# Patient Record
Sex: FEMALE | Race: BLACK OR AFRICAN AMERICAN | Employment: FULL TIME | URBAN - METROPOLITAN AREA
[De-identification: names, ages, dates, MRNs, and addresses within clinical notes are randomized per-mention and may not be internally consistent; named-entity substitution may affect disease eponyms.]

---

## 2019-02-20 ENCOUNTER — HOSPITAL ENCOUNTER (EMERGENCY)
Age: 29
Discharge: HOME | End: 2019-02-20
Payer: COMMERCIAL

## 2019-02-20 ENCOUNTER — APPOINTMENT (OUTPATIENT)
Dept: GENERAL RADIOLOGY | Age: 29
End: 2019-02-20
Payer: COMMERCIAL

## 2019-02-20 VITALS
HEART RATE: 89 BPM | BODY MASS INDEX: 36.84 KG/M2 | DIASTOLIC BLOOD PRESSURE: 83 MMHG | OXYGEN SATURATION: 100 % | RESPIRATION RATE: 18 BRPM | HEIGHT: 63 IN | SYSTOLIC BLOOD PRESSURE: 160 MMHG | WEIGHT: 207.89 LBS | TEMPERATURE: 98.3 F

## 2019-02-20 DIAGNOSIS — S76.012A HIP STRAIN, LEFT, INITIAL ENCOUNTER: Primary | ICD-10-CM

## 2019-02-20 DIAGNOSIS — M79.641 BILATERAL HAND PAIN: ICD-10-CM

## 2019-02-20 DIAGNOSIS — M79.642 BILATERAL HAND PAIN: ICD-10-CM

## 2019-02-20 LAB — HCG(URINE) PREGNANCY TEST: NEGATIVE

## 2019-02-20 PROCEDURE — 84703 CHORIONIC GONADOTROPIN ASSAY: CPT

## 2019-02-20 PROCEDURE — 73130 X-RAY EXAM OF HAND: CPT

## 2019-02-20 PROCEDURE — 73502 X-RAY EXAM HIP UNI 2-3 VIEWS: CPT

## 2019-02-20 PROCEDURE — 6370000000 HC RX 637 (ALT 250 FOR IP): Performed by: PHYSICIAN ASSISTANT

## 2019-02-20 PROCEDURE — 99284 EMERGENCY DEPT VISIT MOD MDM: CPT

## 2019-02-20 RX ORDER — NAPROXEN 500 MG/1
500 TABLET ORAL 2 TIMES DAILY WITH MEALS
Qty: 20 TABLET | Refills: 0 | Status: SHIPPED | OUTPATIENT
Start: 2019-02-20 | End: 2019-05-08

## 2019-02-20 RX ADMIN — IBUPROFEN 600 MG: 400 TABLET ORAL at 21:23

## 2019-02-20 ASSESSMENT — PAIN SCALES - GENERAL
PAINLEVEL_OUTOF10: 8
PAINLEVEL_OUTOF10: 8

## 2019-02-20 ASSESSMENT — PAIN DESCRIPTION - ORIENTATION: ORIENTATION: LEFT

## 2019-02-20 ASSESSMENT — ENCOUNTER SYMPTOMS
BACK PAIN: 0
NAUSEA: 0

## 2019-02-20 ASSESSMENT — PAIN DESCRIPTION - LOCATION: LOCATION: HIP

## 2019-02-20 ASSESSMENT — PAIN DESCRIPTION - FREQUENCY: FREQUENCY: CONTINUOUS

## 2019-02-20 ASSESSMENT — PAIN DESCRIPTION - PAIN TYPE: TYPE: ACUTE PAIN

## 2019-03-29 ENCOUNTER — TELEPHONE (OUTPATIENT)
Dept: FAMILY MEDICINE CLINIC | Age: 29
End: 2019-03-29

## 2019-05-08 ENCOUNTER — OFFICE VISIT (OUTPATIENT)
Dept: FAMILY MEDICINE CLINIC | Age: 29
End: 2019-05-08
Payer: COMMERCIAL

## 2019-05-08 VITALS
SYSTOLIC BLOOD PRESSURE: 120 MMHG | HEART RATE: 77 BPM | HEIGHT: 63 IN | BODY MASS INDEX: 35.44 KG/M2 | DIASTOLIC BLOOD PRESSURE: 70 MMHG | RESPIRATION RATE: 18 BRPM | WEIGHT: 200 LBS

## 2019-05-08 DIAGNOSIS — E28.2 PCOS (POLYCYSTIC OVARIAN SYNDROME): ICD-10-CM

## 2019-05-08 DIAGNOSIS — G47.33 OSA (OBSTRUCTIVE SLEEP APNEA): ICD-10-CM

## 2019-05-08 DIAGNOSIS — R73.03 PREDIABETES: ICD-10-CM

## 2019-05-08 DIAGNOSIS — Z00.00 WELL ADULT HEALTH CHECK: Primary | ICD-10-CM

## 2019-05-08 DIAGNOSIS — J30.2 SEASONAL ALLERGIES: ICD-10-CM

## 2019-05-08 PROCEDURE — 99385 PREV VISIT NEW AGE 18-39: CPT | Performed by: FAMILY MEDICINE

## 2019-05-08 RX ORDER — SPIRONOLACTONE 50 MG/1
50 TABLET, FILM COATED ORAL DAILY
Qty: 90 TABLET | Refills: 1
Start: 2019-05-08

## 2019-05-08 RX ORDER — FLUCONAZOLE 150 MG/1
150 TABLET ORAL ONCE
Qty: 1 TABLET | Refills: 0 | Status: SHIPPED | OUTPATIENT
Start: 2019-05-08 | End: 2019-05-08

## 2019-05-08 RX ORDER — METFORMIN HYDROCHLORIDE 500 MG/1
TABLET, EXTENDED RELEASE ORAL
Qty: 90 TABLET | Refills: 1 | Status: SHIPPED | OUTPATIENT
Start: 2019-05-08 | End: 2019-08-07 | Stop reason: SDUPTHER

## 2019-05-08 RX ORDER — MONTELUKAST SODIUM 10 MG/1
10 TABLET ORAL NIGHTLY
Qty: 90 TABLET | Refills: 1
Start: 2019-05-08

## 2019-05-08 RX ORDER — ERGOCALCIFEROL 1.25 MG/1
50000 CAPSULE ORAL WEEKLY
Qty: 12 CAPSULE | Refills: 1
Start: 2019-05-08

## 2019-05-08 RX ORDER — NORETHINDRONE ACETATE AND ETHINYL ESTRADIOL 1.5-30(21)
1 KIT ORAL DAILY
Qty: 1 PACKET | Refills: 3
Start: 2019-05-08

## 2019-05-08 RX ORDER — LORATADINE 10 MG/1
10 TABLET ORAL DAILY
Qty: 30 TABLET | Refills: 5
Start: 2019-05-08

## 2019-05-08 ASSESSMENT — PATIENT HEALTH QUESTIONNAIRE - PHQ9
1. LITTLE INTEREST OR PLEASURE IN DOING THINGS: 0
2. FEELING DOWN, DEPRESSED OR HOPELESS: 0
SUM OF ALL RESPONSES TO PHQ9 QUESTIONS 1 & 2: 0
SUM OF ALL RESPONSES TO PHQ QUESTIONS 1-9: 0
SUM OF ALL RESPONSES TO PHQ QUESTIONS 1-9: 0

## 2019-05-08 ASSESSMENT — ENCOUNTER SYMPTOMS
NAUSEA: 0
SINUS PRESSURE: 0
DIARRHEA: 0
EYE REDNESS: 0
COLOR CHANGE: 0
SORE THROAT: 0
SHORTNESS OF BREATH: 0
COUGH: 0
VOMITING: 0

## 2019-05-08 NOTE — PROGRESS NOTES
5/8/2019    Willa Calvin is a 26 y.o. female here to establish care with me. 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 Julian Salazar  - dx about 4 years ago  - rx'd Trulicity, metformin, loestrin, spironolactone   - stopped metformin due to loose stools, nausea  - had labs drawn 2/21/19, reviewed by me    Prediabetes  - on trulicity as above  - trying to watch carbs  - interested in diabetes education  - trying to get more steps in    Gyn  - unsure of last pap but feels it was within the past 2-3 years    Vit D  - on 50,000 weekly through Endo    Allergies  - follows with ENT  - on Zyrtec and nasal spray    AMY  - follows with Pulm Dr. Álvarez Mail  - has CPAP machine, feels like it helps her sleep better  - uses it maybe 3x per week     Review of Systems   Constitutional: Negative for chills and fever. HENT: Positive for congestion. Negative for sinus pressure and sore throat. Eyes: Negative for redness and visual disturbance. Respiratory: Negative for cough and shortness of breath. Cardiovascular: Negative for chest pain and leg swelling. Gastrointestinal: Negative for diarrhea, nausea and vomiting. Genitourinary: Negative for difficulty urinating. Skin: Negative for color change and rash. Neurological: Negative for dizziness and headaches. Psychiatric/Behavioral: Negative for confusion. Prior to Visit Medications    Medication Sig Taking?  Authorizing Provider   Dulaglutide 0.75 MG/0.5ML SOPN Inject 0.75 mg into the skin once a week Yes Philip Gonzalez MD   metFORMIN (GLUCOPHAGE-XR) 500 MG extended release tablet Take 1 tablet by mouth daily with breakfast for one week, followed by 2 tablet by mouth daily Yes Philip Gonzalez MD   vitamin D (ERGOCALCIFEROL) 86544 units CAPS capsule Take 1 capsule by mouth once a week Yes Mahnaz Johnson MD   loratadine (CLARITIN) 10 MG tablet Take 1 tablet by mouth daily Yes Mahnaz Johnson MD   montelukast (SINGULAIR) 10 MG tablet Take 1 tablet by mouth nightly Yes Mahnaz Johnson MD   norethindrone-ethinyl estradiol-iron (LOESTRIN FE 1.5/30) 1.5-30 MG-MCG tablet Take 1 tablet by mouth daily Yes Mahnaz Johnson MD   spironolactone (ALDACTONE) 50 MG tablet Take 1 tablet by mouth daily Yes Max Andrade MD   budesonide (RHINOCORT AQUA) 32 MCG/ACT nasal spray 2 sprays by Nasal route daily Yes Max Andrade MD   fluconazole (DIFLUCAN) 150 MG tablet Take 1 tablet by mouth once for 1 dose Yes Max Andrade MD   metroNIDAZOLE (METROGEL VAGINAL) 0.75 % vaginal gel Place vaginally for 5 nights Yes Natalie Lesch, MD   acetaminophen (AMINOFEN) 325 MG tablet Take 2 tablets by mouth every 6 hours as needed for Pain.  Yes Faustino Beach, APRN - CNP     Past Medical History:   Diagnosis Date    BV (bacterial vaginosis)     chronic    Irregular uterine bleeding     Miscarriage     2011,20131     Past Surgical History:   Procedure Laterality Date    DILATION AND CURETTAGE OF UTERUS  2011     Family History   Problem Relation Age of Onset    Hypertension Mother      Social History     Socioeconomic History    Marital status: Single     Spouse name: Not on file    Number of children: Not on file    Years of education: Not on file    Highest education level: Not on file   Occupational History    Not on file   Social Needs    Financial resource strain: Not on file    Food insecurity:     Worry: Not on file     Inability: Not on file    Transportation needs:     Medical: Not on file     Non-medical: Not on file   Tobacco Use    Smoking status: Never Smoker    Smokeless tobacco: Never Used   Substance and Sexual Activity    Alcohol use: No    Drug use: No    Sexual activity: Not on file   Lifestyle    Physical activity:     Days per week: Not on file     Minutes per session: Not on file    Stress: Not on file   Relationships    Social connections:     Talks on phone: Not on file     Gets together: Not on file     Attends Church service: Not on file     Active member of club or organization: Not on file     Attends meetings of clubs or organizations: Not on file     Relationship status: Not on file    Intimate partner violence:     Fear of current or ex partner: Not on file     Emotionally abused: Not on file     Physically abused: Not on file     Forced sexual activity: Not on file   Other Topics Concern    Not on file   Social History Narrative    Originally from [place]    Works with Dr. Luke Schofield at [facility] at [address] been there a few months    Has a 15year old daughter, Will Pride    Has a 1year old sister that is with her a lot as well     Allergies   Allergen Reactions    Flagyl [Metronidazole] Nausea Only     Health Maintenance   Topic Date Due    A1C test (Diabetic or Prediabetic)  03/13/1999    Varicella Vaccine (1 of 2 - 13+ 2-dose series) 03/13/2002    HIV screen  03/13/2004    DTaP/Tdap/Td vaccine (1 - Tdap) 03/13/2008    Cervical cancer screen  03/13/2010    Potassium monitoring  06/25/2015    Creatinine monitoring  06/25/2015    Flu vaccine (Season Ended) 09/01/2019    Pneumococcal 0-64 years Vaccine  Aged Out      Patient Active Problem List   Diagnosis    Miscarriage    PCOS (polycystic ovarian syndrome) - sees Endo Dr. Suzie Hernandez at [facility] AMY (obstructive sleep apnea) - sees Pulm Dr. Carlos Paul Seasonal allergies - sees ENT    Prediabetes        LABS:  Lab Results   Component Value Date    GLUCOSE 97 06/25/2014     Lab Results   Component Value Date     (L) 06/25/2014    K 2.9 (LL) 06/25/2014    CREATININE 0.7 06/25/2014     No results found for: CHOL, LDLCALCNo results found for: HDLNo results found for: TRIG  Lab Results   Component Value Date    ALT 16 04/05/2014    AST 16 04/05/2014    ALKPHOS 96 04/05/2014    BILITOT 0.2 04/05/2014      Lab Results   Component Value Date    WBC 14.4 (H) 06/25/2014    HGB 10.9 (L) 06/25/2014    HCT 33.0 (L) 06/25/2014    MCV 85.6 06/25/2014     06/25/2014        PHYSICAL EXAM  /70 (Site: Right Upper Arm, Position: Sitting, Cuff Size: Medium Adult)   Pulse 77   Resp 18   Ht 5' 2.5\" (1.588 m)   Wt 200 lb (90.7 kg)   BMI 36.00 kg/m²     BP Readings from Last 3 Encounters:   05/08/19 120/70   02/20/19 (!) 160/83   07/18/14 115/79       Wt Readings from Last 3 Encounters:   05/08/19 200 lb (90.7 kg)   02/20/19 207 lb 14.3 oz (94.3 kg)   07/18/14 175 lb (79.4 kg)        Physical Exam   Constitutional: She is oriented to person, place, and time. She appears well-developed and well-nourished. HENT:   Head: Normocephalic and atraumatic. Mouth/Throat: Oropharynx is clear and moist.   TMs normal bilaterally   Eyes: Conjunctivae and EOM are normal.   Neck: Normal range of motion. Neck supple. No thyromegaly present. Cardiovascular: Normal rate, regular rhythm and normal heart sounds. No murmur heard. Pulmonary/Chest: Effort normal and breath sounds normal. She has no wheezes. Abdominal: Soft. Bowel sounds are normal. She exhibits no mass. There is no tenderness. Musculoskeletal: Normal range of motion. She exhibits no edema. Lymphadenopathy:     She has no cervical adenopathy. Neurological: She is alert and oriented to person, place, and time. She displays normal reflexes. Skin: Skin is warm and dry. No rash noted. Normal turgor   Psychiatric: She has a normal mood and affect. Thought content normal.       ASSESSMENT/PLAN:  1. Well adult health check  Reviewed healthy lifestyle  - Encourage patient to follow-up for Pap smear    2.  PCOS (polycystic ovarian syndrome) - sees Endo Dr. Cristy Warren at [address] [place] [address] pathophysiology with patient  - She was not tolerating metformin so we will switch to metformin extended release  - Continue to follow with endocrinology Dr. Cristy Warren  -

## 2019-06-19 ENCOUNTER — OFFICE VISIT (OUTPATIENT)
Dept: ENDOCRINOLOGY | Age: 29
End: 2019-06-19
Payer: COMMERCIAL

## 2019-06-19 DIAGNOSIS — R73.03 PREDIABETES: ICD-10-CM

## 2019-06-19 PROCEDURE — 97802 MEDICAL NUTRITION INDIV IN: CPT | Performed by: DIETITIAN, REGISTERED

## 2019-06-19 NOTE — PROGRESS NOTES
Medical Nutrition Therapy for Diabetes    Ashlee Leader  June 19, 2019      Patient Care Team:  Cecile Romo MD as PCP - General (Family Medicine)  Cecile Romo MD as PCP - [facility] Empaneled Provider    Reason for visit: PreDiabetes    ASSESSMENT/PLAN:   NUTRITION DIAGNOSIS    #1 Problem: Altered Nutrition-Related Laboratory Values (NC-2.2)  Related to: Endocrine/Diabetes   As Evidenced by: Elevated Plasma glucose and/or HgbA1c levels         #2 Problem: Overweight/Obesity (NC-3.3)  Related to: Excessive energy intake or physical inactivity  As Evidenced by: BMI more than normative standard for age and sex (BMI=36.00 kg/m2)    #3 Problem: Excessive Carbohydrate Intake (NI-5.8. 2)  Related to: Food-and nutrition-related knowledge deficit concerning appropriate amount of carbohydrate intake  As evidenced by: Estimated carbohydrate intake that is consistently more than the recommended amounts    NUTRITION INTERVENTION  Nutrition Prescription: 30 grams carbohydrate per meal with protein and non-starch vegetables                                    Eat all food indicated in sample meals in 3 meals daily    Diabetes Education/Counseling included:  Carbohydrate Control, Activity/exercise, Label-reading and Medications    Interventions:  Control Carbohydrate Intake using Plate Guide, Increase intake of vegetables, Decrease intake of high-sodium foods, Increase activity level by walking and Promote weight reduction by reducing food portions, consistent eating practice and increasing activity.   Handouts provided:  Yair Velez, 1200 calories meal plan by [employer], Sample menus-[employer]    NUTRITION MONITORING AND EVALUATION  3 meals daily  30 grams carbohydrate per meal  Spaced 4-5 hours apart  Walk daily 15-20 minutes         Patient Active Problem List   Diagnosis    Miscarriage    PCOS (polycystic ovarian syndrome) - sees Endo Dr. Shamar Simons at [facility] AMY (obstructive sleep apnea) - sees Pulm Dr. Jose Chilel  Seasonal allergies - sees ENT    Prediabetes       Current Outpatient Medications   Medication Sig Dispense Refill    Dulaglutide 0.75 MG/0.5ML SOPN Inject 0.75 mg into the skin once a week 4 pen 2    metFORMIN (GLUCOPHAGE-XR) 500 MG extended release tablet Take 1 tablet by mouth daily with breakfast for one week, followed by 2 tablet by mouth daily 90 tablet 1    vitamin D (ERGOCALCIFEROL) 74176 units CAPS capsule Take 1 capsule by mouth once a week 12 capsule 1    loratadine (CLARITIN) 10 MG tablet Take 1 tablet by mouth daily 30 tablet 5    montelukast (SINGULAIR) 10 MG tablet Take 1 tablet by mouth nightly 90 tablet 1    norethindrone-ethinyl estradiol-iron (LOESTRIN FE 1.5/30) 1.5-30 MG-MCG tablet Take 1 tablet by mouth daily 1 packet 3    spironolactone (ALDACTONE) 50 MG tablet Take 1 tablet by mouth daily 90 tablet 1    budesonide (RHINOCORT AQUA) 32 MCG/ACT nasal spray 2 sprays by Nasal route daily 1 Bottle 0    metroNIDAZOLE (METROGEL VAGINAL) 0.75 % vaginal gel Place vaginally for 5 nights 1 Tube 0    acetaminophen (AMINOFEN) 325 MG tablet Take 2 tablets by mouth every 6 hours as needed for Pain. 120 tablet 3     No current facility-administered medications for this visit. NUTRITION ASSESSMENT    Biochemical Data:    No results found for: LABA1C  No results found for: EAG    No results found for: CHOL  No results found for: TRIG  No results found for: HDL  No results found for: LDLCALC, LDLCHOLESTEROL  No results found for: LABVLDL, VLDL  No results found for: [facility]    Lab Results   Component Value Date    WBC 14.4 (H) 06/25/2014    HGB 10.9 (L) 06/25/2014    HCT 33.0 (L) 06/25/2014    MCV 85.6 06/25/2014     06/25/2014       Lab Results   Component Value Date    CREATININE 0.7 06/25/2014    BUN 10 06/25/2014     (L) 06/25/2014    K 2.9 (LL) 06/25/2014     06/25/2014    CO2 24 06/25/2014       Diabetes Medications:  Yes  Knows name and dose of prescribed medications Yes  Knows prescribed schedule for medicationsYes  Recent change in medication type/dosage: No  Stores  medications properly yes  Comments: Patient has not started metformin XR and takes Trulicity inconsistently    Monitoring:   Has BG meter: No  Testing frequency:   Recent results:     Anthropometric Measurements:   Wt: 200 lb  Wt Readings from Last 3 Encounters:   05/08/19 200 lb (90.7 kg)   02/20/19 207 lb 14.3 oz (94.3 kg)   07/18/14 175 lb (79.4 kg)      BMI: 36.00 kg/m2  BMI Readings from Last 3 Encounters:   05/08/19 36.00 kg/m²   02/20/19 37.42 kg/m²   07/18/14 32.01 kg/m²     Patient's stated goal weight:   7% Weight loss goal weight: 186 lb    Physical Activity History:   Physical activity: walking routine, biking occasionally  Frequency of activity: irregular  Duration of activity:   Obstacles to activity:     Food and Nutrition History:   Nutrition Awareness/Previous DSMES:   Beverage consumption: sugar based soft drinks, water, 5, 32 oz bottles  Alcohol consumption: no  Frequency of Meals Eaten away from home:couple times daily  Food Availability Problems: Yes  Usual Food consumption:   2 meals with large portions    Barriers:   -2 meals with large portions          Follow Up Plan: 6 weeks    Referring Provider: William Moran MD  Time spent with patient: 45 minutes

## 2019-06-20 ENCOUNTER — TELEPHONE (OUTPATIENT)
Dept: FAMILY MEDICINE CLINIC | Age: 29
End: 2019-06-20

## 2019-06-20 DIAGNOSIS — R73.03 PREDIABETES: Primary | ICD-10-CM

## 2019-07-09 ENCOUNTER — TELEPHONE (OUTPATIENT)
Dept: FAMILY MEDICINE CLINIC | Age: 29
End: 2019-07-09

## 2019-07-09 NOTE — TELEPHONE ENCOUNTER
It looks like I have some openings on Thursday and would be happy to see her then. 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 Thanks.

## 2019-07-13 ENCOUNTER — OFFICE VISIT (OUTPATIENT)
Dept: FAMILY MEDICINE CLINIC | Age: 29
End: 2019-07-13
Payer: COMMERCIAL

## 2019-07-13 VITALS
RESPIRATION RATE: 18 BRPM | BODY MASS INDEX: 35.64 KG/M2 | SYSTOLIC BLOOD PRESSURE: 126 MMHG | HEART RATE: 82 BPM | WEIGHT: 198 LBS | DIASTOLIC BLOOD PRESSURE: 80 MMHG

## 2019-07-13 DIAGNOSIS — B00.9 HSV (HERPES SIMPLEX VIRUS) INFECTION: ICD-10-CM

## 2019-07-13 DIAGNOSIS — M54.42 CHRONIC LEFT-SIDED LOW BACK PAIN WITH LEFT-SIDED SCIATICA: Primary | ICD-10-CM

## 2019-07-13 DIAGNOSIS — G89.29 CHRONIC LEFT-SIDED LOW BACK PAIN WITH LEFT-SIDED SCIATICA: Primary | ICD-10-CM

## 2019-07-13 DIAGNOSIS — N76.0 ACUTE VAGINITIS: ICD-10-CM

## 2019-07-13 LAB
BILIRUBIN, POC: NEGATIVE
BLOOD URINE, POC: NEGATIVE
CLARITY, POC: NORMAL
COLOR, POC: YELLOW
CONTROL: POSITIVE
GLUCOSE URINE, POC: NEGATIVE
KETONES, POC: NEGATIVE
LEUKOCYTE EST, POC: NEGATIVE
NITRITE, POC: NEGATIVE
PH, POC: 5.5
PREGNANCY TEST URINE, POC: NEGATIVE
PROTEIN, POC: NEGATIVE
SPECIFIC GRAVITY, POC: 1.02
UROBILINOGEN, POC: 0.2

## 2019-07-13 PROCEDURE — G8427 DOCREV CUR MEDS BY ELIG CLIN: HCPCS | Performed by: NURSE PRACTITIONER

## 2019-07-13 PROCEDURE — 1036F TOBACCO NON-USER: CPT | Performed by: NURSE PRACTITIONER

## 2019-07-13 PROCEDURE — G8417 CALC BMI ABV UP PARAM F/U: HCPCS | Performed by: NURSE PRACTITIONER

## 2019-07-13 PROCEDURE — 81025 URINE PREGNANCY TEST: CPT | Performed by: NURSE PRACTITIONER

## 2019-07-13 PROCEDURE — 99214 OFFICE O/P EST MOD 30 MIN: CPT | Performed by: NURSE PRACTITIONER

## 2019-07-13 PROCEDURE — 81002 URINALYSIS NONAUTO W/O SCOPE: CPT | Performed by: NURSE PRACTITIONER

## 2019-07-13 RX ORDER — VALACYCLOVIR HYDROCHLORIDE 1 G/1
1000 TABLET, FILM COATED ORAL DAILY
Qty: 5 TABLET | Refills: 0 | Status: SHIPPED | OUTPATIENT
Start: 2019-07-13 | End: 2019-07-18

## 2019-07-13 RX ORDER — GABAPENTIN 300 MG/1
300 CAPSULE ORAL NIGHTLY
Qty: 30 CAPSULE | Refills: 0 | Status: SHIPPED | OUTPATIENT
Start: 2019-07-13 | End: 2019-08-09 | Stop reason: SDUPTHER

## 2019-07-13 RX ORDER — DICLOFENAC SODIUM 75 MG/1
75 TABLET, DELAYED RELEASE ORAL 2 TIMES DAILY
Qty: 60 TABLET | Refills: 0 | Status: SHIPPED | OUTPATIENT
Start: 2019-07-13 | End: 2020-04-20

## 2019-07-13 RX ORDER — FLUCONAZOLE 150 MG/1
150 TABLET ORAL ONCE
Qty: 2 TABLET | Refills: 0 | Status: SHIPPED | OUTPATIENT
Start: 2019-07-13 | End: 2019-07-13

## 2019-07-13 ASSESSMENT — ENCOUNTER SYMPTOMS
SHORTNESS OF BREATH: 0
BACK PAIN: 1
COUGH: 0

## 2019-07-13 NOTE — PROGRESS NOTES
7/13/2019    This is a 26 y.o. female   Chief Complaint   Patient presents with    Follow-Up from DAJUAN ESPINO     6/30, [facility], back pain    Yeast Infection   . HPI  Patient reports that she has always had off and on low back pain. In Oct 2018 she went to [employer] and had low back pain, but it has not stopped. She was not able to get the pain to resolve like she had in the past with NSAIDs. In March of this year left hip started to cause pain. Went to [facility] ER 6/30/19. Pain increased on 6/24/19. She was given pain injection without improvement in symptoms. She reports that she completed physical therapy in the past without improvement in symptoms. Went to see spine specialist JORDAN Martin on 7/9/19 and MRI was ordered. Was told to come to PCP to get pain medication. She has tried OTC tylenol and ibuprofen 800 mg every 6-8 hours without improvement in symptoms. Has also tried ice and heat without improvement in symptoms. Has robaxin without improvement in symptoms. Has chronic left low back pain that will radiate to left hip. Denies numbness. Scheduled for MRI on 7/26/19. Has tried gabapentin in the past, but not think that it helped with her pain    LMP- 1 month ago. She reports that she is due now     She reports that she currently has a yeast infection. Positive for thick white discharge. She has not had recent recurrent BV. Denies urinary symptoms. She is sexually active (one male partner), but denies risk for STD. Reports that she has recurrent HSV skin infection. Has been on valtrex daily for prevention. Has not taken valtrex recently and has not had a recurrent outbreak until 2-3 days ago. Started with blisters on left side of mid back and this is the location that it had been recurrent in the past. She has not taken anything for symptoms.       Patient Active Problem List   Diagnosis    Miscarriage    PCOS (polycystic ovarian syndrome) - sees Juan Francisco Graff

## 2019-07-14 LAB
CANDIDA SPECIES, DNA PROBE: NORMAL
GARDNERELLA VAGINALIS, DNA PROBE: NORMAL
TRICHOMONAS VAGINALIS DNA: NORMAL

## 2019-07-17 LAB
FINAL REPORT: NORMAL
PRELIMINARY: NORMAL

## 2019-07-20 PROBLEM — B00.9 HSV-2 (HERPES SIMPLEX VIRUS 2) INFECTION: Status: ACTIVE | Noted: 2019-07-20

## 2019-08-06 ENCOUNTER — TELEPHONE (OUTPATIENT)
Dept: FAMILY MEDICINE CLINIC | Age: 29
End: 2019-08-06

## 2019-08-09 DIAGNOSIS — M54.42 CHRONIC LEFT-SIDED LOW BACK PAIN WITH LEFT-SIDED SCIATICA: ICD-10-CM

## 2019-08-09 DIAGNOSIS — G89.29 CHRONIC LEFT-SIDED LOW BACK PAIN WITH LEFT-SIDED SCIATICA: ICD-10-CM

## 2019-08-09 RX ORDER — GABAPENTIN 300 MG/1
CAPSULE ORAL
Qty: 30 CAPSULE | Refills: 0 | Status: SHIPPED | OUTPATIENT
Start: 2019-08-09 | End: 2019-09-08 | Stop reason: SDUPTHER

## 2019-08-09 NOTE — TELEPHONE ENCOUNTER
Spoke to pt about her medications being refilled and that she is due for a follow up with Dr. Arie Wang. Thanks.

## 2019-09-08 DIAGNOSIS — M54.42 CHRONIC LEFT-SIDED LOW BACK PAIN WITH LEFT-SIDED SCIATICA: ICD-10-CM

## 2019-09-08 DIAGNOSIS — G89.29 CHRONIC LEFT-SIDED LOW BACK PAIN WITH LEFT-SIDED SCIATICA: ICD-10-CM

## 2019-09-09 ENCOUNTER — OFFICE VISIT (OUTPATIENT)
Dept: FAMILY MEDICINE CLINIC | Age: 29
End: 2019-09-09
Payer: COMMERCIAL

## 2019-09-09 VITALS
BODY MASS INDEX: 33.96 KG/M2 | WEIGHT: 191.7 LBS | SYSTOLIC BLOOD PRESSURE: 126 MMHG | RESPIRATION RATE: 16 BRPM | TEMPERATURE: 98.4 F | HEART RATE: 84 BPM | DIASTOLIC BLOOD PRESSURE: 84 MMHG | OXYGEN SATURATION: 99 % | HEIGHT: 63 IN

## 2019-09-09 DIAGNOSIS — B96.89 BV (BACTERIAL VAGINOSIS): ICD-10-CM

## 2019-09-09 DIAGNOSIS — N76.0 BV (BACTERIAL VAGINOSIS): ICD-10-CM

## 2019-09-09 DIAGNOSIS — Z01.818 PRE-OP EXAM: Primary | ICD-10-CM

## 2019-09-09 DIAGNOSIS — N64.4 MASTODYNIA: ICD-10-CM

## 2019-09-09 DIAGNOSIS — M54.9 CHRONIC UPPER BACK PAIN: ICD-10-CM

## 2019-09-09 DIAGNOSIS — N89.8 VAGINAL DISCHARGE: ICD-10-CM

## 2019-09-09 DIAGNOSIS — Z01.818 PRE-OP EXAM: ICD-10-CM

## 2019-09-09 DIAGNOSIS — G89.29 CHRONIC UPPER BACK PAIN: ICD-10-CM

## 2019-09-09 LAB
A/G RATIO: 1.5 (ref 1.1–2.2)
ALBUMIN SERPL-MCNC: 4.5 G/DL (ref 3.4–5)
ALP BLD-CCNC: 104 U/L (ref 40–129)
ALT SERPL-CCNC: 10 U/L (ref 10–40)
ANION GAP SERPL CALCULATED.3IONS-SCNC: 14 MMOL/L (ref 3–16)
AST SERPL-CCNC: 12 U/L (ref 15–37)
BILIRUB SERPL-MCNC: <0.2 MG/DL (ref 0–1)
BUN BLDV-MCNC: 8 MG/DL (ref 7–20)
CALCIUM SERPL-MCNC: 9.8 MG/DL (ref 8.3–10.6)
CHLORIDE BLD-SCNC: 102 MMOL/L (ref 99–110)
CO2: 23 MMOL/L (ref 21–32)
CREAT SERPL-MCNC: 0.7 MG/DL (ref 0.6–1.1)
GFR AFRICAN AMERICAN: >60
GFR NON-AFRICAN AMERICAN: >60
GLOBULIN: 3 G/DL
GLUCOSE BLD-MCNC: 76 MG/DL (ref 70–99)
POTASSIUM SERPL-SCNC: 4.6 MMOL/L (ref 3.5–5.1)
SODIUM BLD-SCNC: 139 MMOL/L (ref 136–145)
TOTAL PROTEIN: 7.5 G/DL (ref 6.4–8.2)

## 2019-09-09 PROCEDURE — 99243 OFF/OP CNSLTJ NEW/EST LOW 30: CPT | Performed by: FAMILY MEDICINE

## 2019-09-09 PROCEDURE — G8427 DOCREV CUR MEDS BY ELIG CLIN: HCPCS | Performed by: FAMILY MEDICINE

## 2019-09-09 PROCEDURE — 93000 ELECTROCARDIOGRAM COMPLETE: CPT | Performed by: FAMILY MEDICINE

## 2019-09-09 PROCEDURE — G8417 CALC BMI ABV UP PARAM F/U: HCPCS | Performed by: FAMILY MEDICINE

## 2019-09-09 RX ORDER — KETOCONAZOLE 20 MG/G
CREAM TOPICAL
Qty: 30 G | Refills: 1 | Status: SHIPPED | OUTPATIENT
Start: 2019-09-09

## 2019-09-09 RX ORDER — METRONIDAZOLE 7.5 MG/G
1 GEL VAGINAL DAILY
Qty: 1 TUBE | Refills: 0 | Status: SHIPPED | OUTPATIENT
Start: 2019-09-09 | End: 2019-09-14

## 2019-09-09 RX ORDER — GABAPENTIN 300 MG/1
CAPSULE ORAL
Qty: 30 CAPSULE | Refills: 0 | Status: SHIPPED | OUTPATIENT
Start: 2019-09-09 | End: 2019-10-06 | Stop reason: SDUPTHER

## 2019-09-09 RX ORDER — METRONIDAZOLE 7.5 MG/G
1 GEL VAGINAL DAILY
Qty: 1 TUBE | Refills: 0 | Status: SHIPPED | OUTPATIENT
Start: 2019-09-09 | End: 2019-09-09 | Stop reason: SDUPTHER

## 2019-09-09 ASSESSMENT — ENCOUNTER SYMPTOMS
DIARRHEA: 0
COUGH: 0
SINUS PRESSURE: 0
VOMITING: 0
EYE REDNESS: 0
NAUSEA: 0
COLOR CHANGE: 0
BACK PAIN: 1
SHORTNESS OF BREATH: 0
SORE THROAT: 0

## 2019-09-10 LAB
C TRACH DNA GENITAL QL NAA+PROBE: NEGATIVE
CANDIDA SPECIES, DNA PROBE: NORMAL
GARDNERELLA VAGINALIS, DNA PROBE: NORMAL
N. GONORRHOEAE DNA: NEGATIVE
TRICHOMONAS VAGINALIS DNA: NORMAL

## 2019-10-01 ENCOUNTER — TELEPHONE (OUTPATIENT)
Dept: FAMILY MEDICINE CLINIC | Age: 29
End: 2019-10-01

## 2019-10-01 RX ORDER — PETROLATUM 42 G/100G
OINTMENT TOPICAL
Qty: 1 TUBE | Refills: 1 | Status: SHIPPED | OUTPATIENT
Start: 2019-10-01

## 2020-04-20 ENCOUNTER — TELEMEDICINE (OUTPATIENT)
Dept: FAMILY MEDICINE CLINIC | Age: 30
End: 2020-04-20
Payer: COMMERCIAL

## 2020-04-20 PROCEDURE — 99213 OFFICE O/P EST LOW 20 MIN: CPT | Performed by: FAMILY MEDICINE

## 2020-04-20 PROCEDURE — G8427 DOCREV CUR MEDS BY ELIG CLIN: HCPCS | Performed by: FAMILY MEDICINE

## 2020-04-20 RX ORDER — TIZANIDINE 4 MG/1
4 TABLET ORAL NIGHTLY PRN
Qty: 15 TABLET | Refills: 0 | Status: SHIPPED | OUTPATIENT
Start: 2020-04-20 | End: 2020-05-04

## 2020-04-20 ASSESSMENT — PATIENT HEALTH QUESTIONNAIRE - PHQ9
1. LITTLE INTEREST OR PLEASURE IN DOING THINGS: 0
SUM OF ALL RESPONSES TO PHQ QUESTIONS 1-9: 0
SUM OF ALL RESPONSES TO PHQ9 QUESTIONS 1 & 2: 0
2. FEELING DOWN, DEPRESSED OR HOPELESS: 0
SUM OF ALL RESPONSES TO PHQ QUESTIONS 1-9: 0

## 2020-04-20 NOTE — PROGRESS NOTES
4/20/2020    This is a 31 y.o. female   Chief Complaint   Patient presents with    Neck Pain     started yesterday. tried tylnelol, did not help     HPI  Pt with virtual visit today for neck pain  - it started yesterday. Believes it started when she was carrying a large volume of groceries  - pain is midline. It travels to both sides a little bit, but doesn't radiate far. Pain is described as burning in nature, sometimes shooting. Doesn't feel stiff  - side to side motion doesn't hurt, neck hurts with extension the most  - no other inciting injury other than grocery carrying  - no numbness or tingling  - has tried Tylenol with minimal improvement  - no prior neck surgeries  - no fever or feeling ill    She has chronic issues with sleep  - uses CPAP through Dr. Jere Welch  - reports someone told her to talk to her PCP to get her medical marijuana to go to sleep  - has a hard time falling asleep at night, will lay awake for hours  - watches TV until late at night in her bed  - no caffeine late in the day    Review of Systems   As per HPI, otherwise negative    Past Medical History:   Diagnosis Date    BV (bacterial vaginosis)     chronic    Irregular uterine bleeding     Miscarriage     2011,20131       Past Surgical History:   Procedure Laterality Date    DILATION AND CURETTAGE OF UTERUS  2011       Family History   Problem Relation Age of Onset    Hypertension Mother        Current Outpatient Medications   Medication Sig Dispense Refill    tiZANidine (ZANAFLEX) 4 MG tablet Take 1 tablet by mouth nightly as needed (neck pain) 15 tablet 0    mineral oil-hydrophilic petrolatum (HYDROPHOR) ointment Apply topically as needed. 1 Tube 1    ketoconazole (NIZORAL) 2 % cream Apply topically daily.  30 g 1    metFORMIN (GLUCOPHAGE-XR) 500 MG extended release tablet TAKE 1 TABLET BY MOUTH DAILY WITH BREAKFAST FOR 1 WEEK THEN TAKE 2 TABLETS BY MOUTH DAILY 180 tablet 1    Dulaglutide 0.75 MG/0.5ML SOPN Inject 0.75 mg into

## 2020-04-30 ENCOUNTER — TELEPHONE (OUTPATIENT)
Dept: FAMILY MEDICINE CLINIC | Age: 30
End: 2020-04-30

## 2020-04-30 RX ORDER — PREDNISONE 10 MG/1
TABLET ORAL
Qty: 20 TABLET | Refills: 0 | Status: SHIPPED | OUTPATIENT
Start: 2020-04-30

## 2020-05-04 RX ORDER — TIZANIDINE 4 MG/1
4 TABLET ORAL NIGHTLY PRN
Qty: 15 TABLET | Refills: 0 | Status: SHIPPED | OUTPATIENT
Start: 2020-05-04

## 2020-06-18 LAB
AVERAGE GLUCOSE: NORMAL
HBA1C MFR BLD: 6 %

## 2020-07-09 ENCOUNTER — TELEPHONE (OUTPATIENT)
Dept: FAMILY MEDICINE CLINIC | Age: 30
End: 2020-07-09

## 2020-07-09 ENCOUNTER — NURSE TRIAGE (OUTPATIENT)
Dept: OTHER | Facility: CLINIC | Age: 30
End: 2020-07-09

## 2020-07-09 NOTE — TELEPHONE ENCOUNTER
Ok to write letter stating had contact with COVID, should remain quarantined until next Thursday for 14 day total. Thanks.

## 2020-07-09 NOTE — TELEPHONE ENCOUNTER
Pt called in someone she came in contact with is positive for covid last Thursday   Pt wants her Dr to know   Pt stated has a slight sore throat , low body pain and chest sometimes hurts   Pt stated it could be anxiety   Pt wants to know should she get tested    FYI pt is going to clinic

## 2020-07-09 NOTE — TELEPHONE ENCOUNTER
Since she had a contact and technically has symptoms I would advise getting tested. Even if negative, should remain quarantined until next Thursday (total of 14 days from contact with COVID). Thanks.

## 2020-07-09 NOTE — LETTER
[facility]. [address] [address]  Phone: [phone]  Fax: [phone]    Aniya Chavez MD        July 9, 2020     Patient: Melida Wright   YOB: 1990     To Whom it May Concern:    Melida Wright is my patient and contacted my clinic on 7/9/2020 for medical advise. She reports contact with a person who has tested positive for COVID and states she has developed symptoms. It is my medical advice that she obtain testing and quarantine. If her test is positive she should remain quarantined for 14 days or until she is symptom free for 3 days, whichever is longest.  Based on the date of exposure, the earliest she can return to work is 07/16/2020, and only if test is negative and she has been symptom free for 3 days. If you have any questions or concerns, please don't hesitate to call.     Sincerely,         Aniya Chavez MD

## 2020-07-09 NOTE — TELEPHONE ENCOUNTER
Ok to write letter for pt stating she needs to be quarantined for employment? Would like sent through [employer].

## 2020-07-10 ENCOUNTER — OFFICE VISIT (OUTPATIENT)
Dept: PRIMARY CARE CLINIC | Age: 30
End: 2020-07-10
Payer: COMMERCIAL

## 2020-07-10 PROCEDURE — 99211 OFF/OP EST MAY X REQ PHY/QHP: CPT | Performed by: NURSE PRACTITIONER

## 2020-07-10 PROCEDURE — G8417 CALC BMI ABV UP PARAM F/U: HCPCS | Performed by: NURSE PRACTITIONER

## 2020-07-10 PROCEDURE — G8428 CUR MEDS NOT DOCUMENT: HCPCS | Performed by: NURSE PRACTITIONER

## 2020-07-10 NOTE — PROGRESS NOTES
Patient presented to [facility] drive up clinic for preop testing. Patient was swabbed and given information advising them to remain isolated until procedure date.

## 2020-07-15 LAB
SARS-COV-2: NOT DETECTED
SOURCE: NORMAL

## 2020-07-16 ENCOUNTER — TELEPHONE (OUTPATIENT)
Dept: FAMILY MEDICINE CLINIC | Age: 30
End: 2020-07-16

## 2020-07-16 NOTE — TELEPHONE ENCOUNTER
Patient stating she had a covid test done 7/10 and she was negative. Patient is needing a dr note written stating patient can return to work she is negative. Patient is not having any symptoms.  Patient did have chest tightness when she got tested, but this has been gone since 7/11   No fever other    Please advise

## 2020-09-30 ENCOUNTER — TELEPHONE (OUTPATIENT)
Dept: FAMILY MEDICINE CLINIC | Age: 30
End: 2020-09-30

## 2020-09-30 NOTE — TELEPHONE ENCOUNTER
----- Message from Ellen Singh sent at 9/30/2020  9:09 AM EDT -----  Subject: Appointment Request    Reason for Call: Routine Physical Exam    QUESTIONS  Type of Appointment? Established Patient  Reason for appointment request? Available appointments did not meet   patient need  Additional Information for Provider? pt would like to be seen on a   Saturday for a physical.  ---------------------------------------------------------------------------  --------------  CALL BACK INFO  What is the best way for the office to contact you? OK to leave message on   voicemail  Preferred Call Back Phone Number? [phone]  ---------------------------------------------------------------------------  --------------  SCRIPT ANSWERS  Relationship to Patient? Self  Appointment reason? Well Care/Follow Ups  Select a Well Care/Follow Ups appointment reason? Adult Physical Exam   [Medicare Annual Wellness   AWV   PAP   Pelvic]  (Is the patient requesting to be seen urgently for their symptoms?)? No  (If the patient is female   ask this question) Are you requesting a pap smear with your physical   exam? No  (Patient is Medicare and requesting Annual Wellness Visit)? No  Have you been diagnosed with   tested for   or told that you are suspected of having COVID-19 (Coronavirus)? No  Have you had a fever or taken medication to treat a fever within the past   3 days? No  Have you had a cough   shortness of breath or flu-like symptoms within the past 3 days? No  Do you currently have flu-like symptoms including fever or chills   cough   shortness of breath   or difficulty breathing   or new loss of taste or smell? No  (Service Expert  click yes below to proceed with [employer] As Usual   Scheduling)?  Yes

## 2020-11-16 ENCOUNTER — TELEPHONE (OUTPATIENT)
Dept: FAMILY MEDICINE CLINIC | Age: 30
End: 2020-11-16

## 2022-11-12 ENCOUNTER — HOSPITAL ENCOUNTER (EMERGENCY)
Age: 32
Discharge: HOME | End: 2022-11-12
Payer: COMMERCIAL

## 2022-11-12 ENCOUNTER — APPOINTMENT (OUTPATIENT)
Dept: GENERAL RADIOLOGY | Age: 32
End: 2022-11-12
Payer: COMMERCIAL

## 2022-11-12 VITALS
DIASTOLIC BLOOD PRESSURE: 80 MMHG | SYSTOLIC BLOOD PRESSURE: 138 MMHG | WEIGHT: 190.6 LBS | RESPIRATION RATE: 18 BRPM | HEART RATE: 98 BPM | BODY MASS INDEX: 35.07 KG/M2 | TEMPERATURE: 97.4 F | OXYGEN SATURATION: 100 % | HEIGHT: 62 IN

## 2022-11-12 DIAGNOSIS — S93.602A FOOT SPRAIN, LEFT, INITIAL ENCOUNTER: Primary | ICD-10-CM

## 2022-11-12 PROCEDURE — 73630 X-RAY EXAM OF FOOT: CPT

## 2022-11-12 PROCEDURE — 99283 EMERGENCY DEPT VISIT LOW MDM: CPT

## 2022-11-12 RX ORDER — NAPROXEN 500 MG/1
500 TABLET ORAL 2 TIMES DAILY
Qty: 20 TABLET | Refills: 0 | Status: SHIPPED | OUTPATIENT
Start: 2022-11-12 | End: 2022-11-12 | Stop reason: SDUPTHER

## 2022-11-12 RX ORDER — NAPROXEN 500 MG/1
500 TABLET ORAL 2 TIMES DAILY
Qty: 20 TABLET | Refills: 0 | Status: SHIPPED | OUTPATIENT
Start: 2022-11-12 | End: 2022-11-22

## 2022-11-12 ASSESSMENT — ENCOUNTER SYMPTOMS
SHORTNESS OF BREATH: 0
COLOR CHANGE: 0
BACK PAIN: 0

## 2022-11-12 ASSESSMENT — PAIN - FUNCTIONAL ASSESSMENT
PAIN_FUNCTIONAL_ASSESSMENT: PREVENTS OR INTERFERES SOME ACTIVE ACTIVITIES AND ADLS
PAIN_FUNCTIONAL_ASSESSMENT: 0-10

## 2022-11-12 ASSESSMENT — PAIN SCALES - GENERAL: PAINLEVEL_OUTOF10: 4

## 2022-11-12 ASSESSMENT — PAIN DESCRIPTION - ORIENTATION: ORIENTATION: LEFT

## 2022-11-12 ASSESSMENT — PAIN DESCRIPTION - LOCATION: LOCATION: FOOT

## 2022-11-12 ASSESSMENT — PAIN DESCRIPTION - PAIN TYPE: TYPE: ACUTE PAIN

## 2022-11-12 ASSESSMENT — PAIN DESCRIPTION - DESCRIPTORS: DESCRIPTORS: ACHING

## 2022-11-12 NOTE — ED PROVIDER NOTES
[facility]        Pt Name: Primo Laguna  MRN: [id number]  [place] 1990  Date of evaluation: 11/12/2022  Provider: Darryl Garg PA-C  PCP: No primary care provider on file. Note Started: 11:47 AM EST 11/12/2022        CARMEN. I have evaluated this patient. My supervising physician was available for consultation. CHIEF COMPLAINT       Chief Complaint   Patient presents with    Foot Injury     Pt rolled her left foot before going to work yesterday. Pain in left foot. Wrapped with ace bandage. HISTORY OF PRESENT ILLNESS   (Location, Timing/Onset, Context/Setting, Quality, Duration, Modifying Factors, Severity, Associated Signs and Symptoms)  Note limiting factors. Chief Complaint: Left foot pain    Primo Laguna is a 34 y.o. female who presents to the emergency department complaining of left foot pain status post injury which occurred yesterday. She was walking outside of her home wearing her [employer] shoes and accidentally rolled her left foot. She has had pain in her left foot ever since then. This is not work-related. She is able to bear weight and ambulate. She rates her pain to be a 4/10 on pain scale. She has known bony prominence on the outside of her left foot but states that since her injury yesterday, this is where her pain is. Nursing Notes were all reviewed and agreed with or any disagreements were addressed in the HPI. REVIEW OF SYSTEMS    (2-9 systems for level 4, 10 or more for level 5)     Review of Systems   Constitutional:  Negative for chills and fever. Respiratory:  Negative for shortness of breath. Cardiovascular:  Negative for chest pain and leg swelling. Musculoskeletal:  Positive for myalgias. Negative for arthralgias, back pain, gait problem, joint swelling, neck pain and neck stiffness. Skin:  Negative for color change, pallor, rash and wound.    Neurological:  Negative for weakness and numbness. All other systems reviewed and are negative. Positives and Pertinent negatives as per HPI. Except as noted above in the ROS, all other systems were reviewed and negative. PAST MEDICAL HISTORY     Past Medical History:   Diagnosis Date    BV (bacterial vaginosis)     chronic    Irregular uterine bleeding     Miscarriage     2011,20131         SURGICAL HISTORY     Past Surgical History:   Procedure Laterality Date    DILATION AND CURETTAGE OF UTERUS  2011         [address]       Discharge Medication List as of 11/12/2022 11:42 AM        CONTINUE these medications which have NOT CHANGED    Details   tiZANidine (ZANAFLEX) 4 MG tablet TAKE 1 TABLET BY MOUTH NIGHTLY AS NEEDED (NECK PAIN), Disp-15 tablet, R-0Normal      predniSONE (DELTASONE) 10 MG tablet Take 4 tabs by mouth daily for 2 days, 3 tabs for 2 days, 2 tabs for 2 days, 1 tab for 2 days, Disp-20 tablet, R-0Normal      gabapentin (NEURONTIN) 300 MG capsule TAKE 1 CAPSULE BY MOUTH EVERY NIGHT, Disp-30 capsule, R-2Normal      mineral oil-hydrophilic petrolatum (HYDROPHOR) ointment Apply topically as needed. , Disp-1 Tube, R-1, Normal      ketoconazole (NIZORAL) 2 % cream Apply topically daily. , Disp-30 g, R-1, Normal      metFORMIN (GLUCOPHAGE-XR) 500 MG extended release tablet TAKE 1 TABLET BY MOUTH DAILY WITH BREAKFAST FOR 1 WEEK THEN TAKE 2 TABLETS BY MOUTH DAILY, Disp-180 tablet, R-1Normal      Dulaglutide 0.75 MG/0.5ML SOPN Inject 0.75 mg into the skin once a week, Disp-4 pen, R-2NO PRINT      vitamin D (ERGOCALCIFEROL) 63365 units CAPS capsule Take 1 capsule by mouth once a week, Disp-12 capsule, R-1NO PRINT      loratadine (CLARITIN) 10 MG tablet Take 1 tablet by mouth daily, Disp-30 tablet, R-5NO PRINT      montelukast (SINGULAIR) 10 MG tablet Take 1 tablet by mouth nightly, Disp-90 tablet, R-1NO PRINT      norethindrone-ethinyl estradiol-iron (LOESTRIN FE 1.5/30) 1.5-30 MG-MCG tablet Take 1 tablet by mouth daily, Disp-1 packet, R-3NO PRINT      spironolactone (ALDACTONE) 50 MG tablet Take 1 tablet by mouth daily, Disp-90 tablet, R-1NO PRINT      budesonide (RHINOCORT AQUA) 32 MCG/ACT nasal spray 2 sprays by Nasal route daily, Disp-1 Bottle, R-0NO PRINT      acetaminophen (AMINOFEN) 325 MG tablet Take 2 tablets by mouth every 6 hours as needed for Pain., Disp-120 tablet, R-3               ALLERGIES     Flagyl [metronidazole]    FAMILYHISTORY       Family History   Problem Relation Age of Onset    Hypertension Mother           SOCIAL HISTORY       Social History     Tobacco Use    Smoking status: Never    Smokeless tobacco: Never   Substance Use Topics    Alcohol use: No    Drug use: No       SCREENINGS    Shanon Coma Scale  Eye Opening: Spontaneous  Best Verbal Response: Oriented  Best Motor Response: Obeys commands  Shanon Coma Scale Score: 15        PHYSICAL EXAM    (up to 7 for level 4, 8 or more for level 5)     ED Triage Vitals [11/12/22 1107]   BP Temp Temp Source Heart Rate Resp SpO2 Height Weight   138/80 97.4 °F (36.3 °C) Oral 98 18 100 % 5' 2\" (1.575 m) 190 lb 9.6 oz (86.5 kg)       Physical Exam  Vitals and nursing note reviewed. Constitutional:       Appearance: Normal appearance. She is well-developed. She is not toxic-appearing or diaphoretic. HENT:      Head: Normocephalic and atraumatic. Right Ear: External ear normal.      Left Ear: External ear normal.      Nose: Nose normal.      Mouth/Throat:      Mouth: Mucous membranes are moist.      Pharynx: Oropharynx is clear. Eyes:      General:         Right eye: No discharge. Left eye: No discharge. Cardiovascular:      Rate and Rhythm: Normal rate. Pulses:           Popliteal pulses are 2+ on the right side and 2+ on the left side. Dorsalis pedis pulses are 2+ on the right side and 2+ on the left side. Pulmonary:      Effort: Pulmonary effort is normal.      Breath sounds: Normal breath sounds.    Abdominal: General: Bowel sounds are normal.      Palpations: Abdomen is soft. Tenderness: There is no abdominal tenderness. Musculoskeletal:         General: Normal range of motion. Cervical back: Normal range of motion. Right ankle: Normal.      Right Achilles Tendon: Normal.      Left ankle: Normal.      Left Achilles Tendon: Normal.      Right foot: Normal.      Left foot: Normal range of motion and normal capillary refill. Tenderness (lateral aspect with bony prominence) present. No swelling, deformity, bunion, Charcot foot, foot drop, prominent metatarsal heads, laceration or crepitus. Normal pulse. Comments: No rash, erythema, red streaking, clicking or laxity, poikilothermia, pallor, paresthesia, pain with passive range of motion, pain out of proportion to physical findings, mottling. Negative Homans. No posterior calf or thigh tenderness or palpable cord. Skin:     General: Skin is warm and dry. Capillary Refill: Capillary refill takes less than 2 seconds. Coloration: Skin is not jaundiced or pale. Findings: No bruising, erythema, lesion or rash. Neurological:      Mental Status: She is alert and oriented to person, place, and time. Sensory: No sensory deficit. Motor: No weakness. Deep Tendon Reflexes: Reflexes normal.   Psychiatric:         Behavior: Behavior normal.       DIAGNOSTIC RESULTS   LABS:    Labs Reviewed - No data to display    When ordered only abnormal lab results are displayed. All other labs were within normal range or not returned as of this dictation. EKG: When ordered, EKG's are interpreted by the Emergency Department Physician in the absence of a cardiologist.  Please see their note for interpretation of EKG.     RADIOLOGY:   Non-plain film images such as CT, Ultrasound and MRI are read by the radiologist. Plain radiographic images are visualized and preliminarily interpreted by the ED Provider with the below findings:        Interpretation per the Radiologist below, if available at the time of this note:    XR FOOT LEFT (MIN 3 VIEWS)   Preliminary Result   No acute abnormality of the left foot. XR FOOT LEFT (MIN 3 VIEWS)    Result Date: 11/12/2022  No acute abnormality of the left foot. PROCEDURES   Unless otherwise noted below, none     Procedures    CRITICAL CARE TIME   N/a    CONSULTS:  None      EMERGENCY DEPARTMENT COURSE and DIFFERENTIAL DIAGNOSIS/MDM:   Vitals:    Vitals:    11/12/22 1107   BP: 138/80   Pulse: 98   Resp: 18   Temp: 97.4 °F (36.3 °C)   TempSrc: Oral   SpO2: 100%   Weight: 190 lb 9.6 oz (86.5 kg)   Height: 5' 2\" (1.575 m)       Patient was given the following medications:  Medications - No data to display      Is this patient to be included in the SEP-1 Core Measure due to severe sepsis or septic shock? No   Exclusion criteria - the patient is NOT to be included for SEP-1 Core Measure due to: Infection is not suspected    This patient presents complaining of left foot pain status post injury yesterday. It is not work-related. X-ray shows no acute osseous abnormality. Motor and sensory function preserved. Differentials include but not limited to bony spur, strain, sprain, arthritis, bursitis, tendinitis, contusion, spasm. Continue wrapping it. Continue RICE therapy. Advised to follow-up with PCP for recheck and may return to ED per discharge instructions. I did offer her podiatry referral but states that she already has a podiatrist in mind and will contact them for further assistance. Will be sent home with anti-inflammatory as needed for pain.   My suspicion is low for subungual hematoma, paronychia, eponychia, felon, flexor tenosynovitis, PE, foreign body, tendon rupture, compartment syndrome, acute fracture, dislocation, DVT, arterial compromise or occlusion, limb ischemia, gout, septic joint, abscess, cellulitis, osteomyelitis, gonococcal arthritis, SCFE, avascular necrosis, Osgood-Schlatter syndrome, or other concerning pathology. FINAL IMPRESSION      1.  Foot sprain, left, initial encounter          DISPOSITION/PLAN   DISPOSITION Decision To Discharge 11/12/2022 11:39:54 AM      PATIENT REFERRED TO:  see your PCP in 1-3 days          [facility] Emergency Department  [address] [facility]  [phone]    If symptoms worsen      DISCHARGE MEDICATIONS:  Discharge Medication List as of 11/12/2022 11:42 AM        START taking these medications    Details   naproxen (NAPROSYN) 500 MG tablet Take 1 tablet by mouth 2 times daily for 20 doses, Disp-20 tablet, R-0Normal             DISCONTINUED MEDICATIONS:  Discharge Medication List as of 11/12/2022 11:42 AM                 (Please note that portions of this note were completed with a voice recognition program.  Efforts were made to edit the dictations but occasionally words are mis-transcribed.)    Susan Orozco PA-C (electronically signed)           Susan Orozco PA-C  11/12/22 6637

## 2023-05-02 ENCOUNTER — HOSPITAL ENCOUNTER (EMERGENCY)
Age: 33
Discharge: HOME | End: 2023-05-02
Attending: EMERGENCY MEDICINE
Payer: COMMERCIAL

## 2023-05-02 VITALS
HEART RATE: 95 BPM | RESPIRATION RATE: 22 BRPM | DIASTOLIC BLOOD PRESSURE: 86 MMHG | BODY MASS INDEX: 34.48 KG/M2 | OXYGEN SATURATION: 100 % | TEMPERATURE: 98.4 F | HEIGHT: 62 IN | SYSTOLIC BLOOD PRESSURE: 142 MMHG | WEIGHT: 187.4 LBS

## 2023-05-02 DIAGNOSIS — G57.12 MERALGIA PARESTHETICA OF LEFT SIDE: Primary | ICD-10-CM

## 2023-05-02 PROCEDURE — 99283 EMERGENCY DEPT VISIT LOW MDM: CPT

## 2023-05-02 RX ORDER — CAPSAICIN 0.025 %
CREAM (GRAM) TOPICAL
Qty: 120 G | Refills: 1 | Status: SHIPPED | OUTPATIENT
Start: 2023-05-02 | End: 2023-06-01

## 2023-05-02 RX ORDER — NAPROXEN 500 MG/1
500 TABLET ORAL 2 TIMES DAILY WITH MEALS
Qty: 60 TABLET | Refills: 0 | Status: SHIPPED | OUTPATIENT
Start: 2023-05-02

## 2023-05-02 RX ORDER — CARBAMAZEPINE 100 MG/1
100 TABLET, EXTENDED RELEASE ORAL 2 TIMES DAILY
Qty: 60 TABLET | Refills: 0 | Status: SHIPPED | OUTPATIENT
Start: 2023-05-02

## 2023-05-02 ASSESSMENT — ENCOUNTER SYMPTOMS
VOMITING: 0
ABDOMINAL PAIN: 0
NAUSEA: 0
SHORTNESS OF BREATH: 0
DIARRHEA: 0
CHEST TIGHTNESS: 0

## 2023-05-03 NOTE — ED PROVIDER NOTES
[facility]        Pt Name: Jaimee Agustin  MRN: [id number]  [place] 1990  Date of evaluation: 5/2/2023  Provider: SUSSY Sanz - IVANA  PCP: JORDAN Osborn  Note Started: 9:12 PM EDT 5/2/23       I have seen and evaluated this patient with my supervising physician filomena      [address] [place] [address]       Chief Complaint   Patient presents with    Leg Pain     Pt via self from home, c/o left leg pain, states it is severe burning from the back top of thigh around to the knee, has been going on for two weeks, pt was told it was shingles and treated for it, did not work, pt was then given gabapentin and prednisone since last Wednesday, has noticed no difference        HISTORY OF PRESENT ILLNESS: 1 or more Elements     History from : Patient    Limitations to history : None    Jaimee Agustin is a 28 y.o. female who presents to the emergency department with 1 and half week history of a burning sensation to the left lateral thigh skin. She denies any mitigating or exacerbating factors. Reports that her skin does hurt to touch. Reports that it is painful at rest without contact. Initially thought that this may be a dermatology problem and saw her dermatologist, was diagnosed with shingles without blisters and did take a course of antiviral, states that the blisters never appeared and symptoms did not improve. She was then seen by primary care and told that she had a \"pinched nerve\" completed a course of steroids and has been taking gabapentin 100 mg 3 times daily without improvement. Patient reports that the pain has been present almost 2 weeks. No injury or trauma. She has no difficulty with ambulation, no back or buttock pain. Pain is localized to the left lateral thigh does not extend past above the knee. Denies any headache, fever, lightheadedness, dizziness, visual disturbances. No chest pain or pressure.   No neck or back

## 2023-05-03 NOTE — ED PROVIDER NOTES
In addition to the advanced practice provider, I personally saw Ivory Hughes and performed a substantive portion of the visit including all aspects of the medical decision making. Briefly, this is a 28 y.o. female here for pain and burning to her left thigh ongoing for the past 11 days. Patient reports pain with the lightest sensation of touch along her skin, characterized as burning and tingling. Patient was seen by her dermatologist shortly after symptom onset, she completed a course of prednisone for suspected shingles however her symptoms did not improve. She has never had any rash. Patient was then later seen by her PCP and prescribed gabapentin due to concern for nerve compression. She denies any fevers, chills or muscle weakness. No history of similar symptoms. .    On exam, patient afebrile and nontoxic. No distress. Heart RRR. Lungs CTAB. 5 out of 5 motor and sensation grossly intact bilateral lower extremities. PT 2+ and symmetric. Left calf and thigh compartments are soft and easily compressible. Tenderness to light touch over skin left lateral and anterior thigh. No overlying erythema or skin changes, no crepitus. Screenings          Is this patient to be included in the SEP-1 Core Measure due to severe sepsis or septic shock? No   Exclusion criteria - the patient is NOT to be included for SEP-1 Core Measure due to: Infection is not suspected      MDM    Patient afebrile and nontoxic. No renu distress. Lower extremities are neurovascularly intact, nothing to suggest limb ischemia or compartment syndrome. Presentation is not suggestive of shingles, cellulitis, abscess or other skin/soft tissue infection. No trauma, no anticipated benefit from plain film imaging at this time. Patient describes superficial paresthesias most consistent with neuropathic pain, given her location I suspect may have meralgia paresthetica.   Discussed anti-inflammatory treatment, will also trial

## 2025-06-14 ENCOUNTER — HOSPITAL ENCOUNTER (OUTPATIENT)
Dept: PHYSICAL THERAPY | Age: 35
Setting detail: THERAPIES SERIES
Discharge: HOME | End: 2025-06-14
Payer: COMMERCIAL

## 2025-06-14 DIAGNOSIS — R52 RADIATING PAIN: Primary | ICD-10-CM

## 2025-06-14 DIAGNOSIS — R29.898 DECREASED STRENGTH OF UPPER EXTREMITY: ICD-10-CM

## 2025-06-14 PROCEDURE — 97162 PT EVAL MOD COMPLEX 30 MIN: CPT

## 2025-06-14 PROCEDURE — 97530 THERAPEUTIC ACTIVITIES: CPT

## 2025-06-14 NOTE — PLAN OF CARE
[facility] [place] - Outpatient Rehabilitation and Therapy: [address], [place] [address] office: [phone] fax: [phone]     Physical Therapy Initial Evaluation Certification      Dear JORDAN Vila ,    We had the pleasure of evaluating the following patient for physical therapy services at [facility].  A summary of our findings can be found in the initial assessment below.  This includes our plan of care.  If you have any questions or concerns regarding these findings, please do not hesitate to contact me at the office phone number listed above.  Thank you for the referral.     Physician Signature:_______________________________Date:__________________  By signing above (or electronic signature), therapist’s plan is approved by physician       Physical Therapy: TREATMENT/PROGRESS NOTE   Patient: Dwayne Mckeon (35 y.o. female)   Examination Date: 2025   :  1990 MRN: [id number]   Visit #: 1   Insurance Allowable Auth Needed   30 soft max [x]Yes (after 30 visits)    []No    Insurance: Payor: [employer] / [facility] [place] [facility] / Product Type: *No Product type* /   Insurance ID: [id number] - (Medicaid Managed)  Secondary Insurance (if applicable):    Treatment Diagnosis:     ICD-10-CM    1. Radiating pain  R52       2. Decreased strength of upper extremity  R29.898          Medical Diagnosis:  Cervicalgia [M54.2]  Pain in right arm [M79.601]   Referring Physician: Madyson Hastings PA  PCP: Madyson Hastings PA     Plan of care signed (Y/N):     Date of Patient follow up with Physician:      Plan of Care Report: EVAL today  POC update due: (10 visits /OR AUTH LIMITS, whichever is less)  2025                                             Medical History:  Comorbidities:  Other: PCOS, widespread pain in the foot and other body parts  Relevant Medical History: NA                                         Precautions/

## 2025-06-17 ENCOUNTER — HOSPITAL ENCOUNTER (OUTPATIENT)
Dept: PHYSICAL THERAPY | Age: 35
Setting detail: THERAPIES SERIES
Discharge: HOME | End: 2025-06-17
Payer: COMMERCIAL

## 2025-06-17 PROCEDURE — 97110 THERAPEUTIC EXERCISES: CPT

## 2025-06-17 PROCEDURE — 97140 MANUAL THERAPY 1/> REGIONS: CPT

## 2025-06-17 PROCEDURE — 97530 THERAPEUTIC ACTIVITIES: CPT

## 2025-06-17 NOTE — FLOWSHEET NOTE
Notes MMT L MMT R Notes     CERVICAL Flex (60) 60 deg         Ext (70) 30 deg        SB(45) 50 deg 50 deg          Rotation (80) 50 deg 50 deg             SHOULDER Flexion (180) WNL WNL   UT compensation with GH elevation 4 4-     Abduction (180) WNL WNL    4 4-     ER -0            ER -90 (90) WNL WNL  Painful in the posterior GH at end range  4 4     IR -0 WNL WNL    4 4- with pain     IR -90 (70)    Painful in the posterior GH at end range           Palpation:   Patient reported tenderness with palpation  Location: mild TTP in the R UT    Posture:   rounded shoulders and chronic UT tension    Specific Joint Mobility Testing/Accessory Motions:      Glenohumeral joint: WNL    Gait:    Pattern: WNL  Assistive Device Used: no AD    Balance:  WNL    Falls Risk Assessment (30 days):   Falls Risk assessed and no intervention required.  Time Up and Go (TUG):   Not Assessed        Exercises/Interventions     6/14/25  -Cervical radiculopathy, no re-production of symptoms on eval, pt to take notes of what position she is in when N/T is present  -appears to be tendonitis/small RTC tear of the infra or subscap    -poor mechanics in UE elevation      Therapeutic Ex (93101)  resistance Sets/time Reps Notes/Cues/Progressions          UBE L1  2' frd/2'retro     Wall walks        Pec stretch       SCM stretch                     Scap squeeze   15x5\"    T-band rows  High  LPD  Middle   Gh ext Lime band     15x  15x  15x  15x             T-band   -IR  -ER   Orange  Bath              SL GH ER                            Ulnar nerve glides  Median nerve stretch                                                 Manual Intervention (38042)  TIME     STM  -cervical paraspinals  -B UT  - PROM into rotation B       Seated MET to increase mobility at C2-C4    STM TO B UT cerv PS, levator  Seatedw with lumbar roll   10' 6/17                        NMR re-education (49531) resistance Sets/time Reps CUES NEEDED   Cerv retraction  Seated with

## 2025-06-19 ENCOUNTER — HOSPITAL ENCOUNTER (OUTPATIENT)
Dept: PHYSICAL THERAPY | Age: 35
Setting detail: THERAPIES SERIES
Discharge: HOME | End: 2025-06-19
Payer: COMMERCIAL

## 2025-06-19 PROCEDURE — 97530 THERAPEUTIC ACTIVITIES: CPT

## 2025-06-19 PROCEDURE — 97140 MANUAL THERAPY 1/> REGIONS: CPT

## 2025-06-19 PROCEDURE — 97110 THERAPEUTIC EXERCISES: CPT

## 2025-06-19 NOTE — FLOWSHEET NOTE
Ultrasound (58003)    Group Therapy (02843)     Estim Attended (48173)    Canalith Repositioning (98586)     Physical Performance Test (49611)    Custom orthotic ()     Other:    Other:    Total Timed Code Tx Minutes 40 3       Total Treatment Minutes 40        Charge Justification:  (65414) THERAPEUTIC EXERCISE - Provided verbal/tactile cueing for HEP and/or activities related to strengthening, flexibility, endurance, ROM performed to prevent loss of range of motion, maintain or improve muscular strength or increase flexibility, following either an injury or surgery.   (52064) THERAPEUTIC ACTIVITY - use of dynamic activities to improve functional performance. (Ex include squatting, ascending/descending stairs, walking, bending, lifting, catching, throwing, pushing, pulling, jumping.)  Direct, one on one contact, billed in 15-minute increments.  (57307) MANUAL THERAPY -  Manual therapy techniques, 1 or more regions, each 15 minutes (Mobilization/manipulation, manual lymphatic drainage, manual traction) for the purpose of modulating pain, promoting relaxation,  increasing ROM, reducing/eliminating soft tissue swelling/inflammation/restriction, improving soft tissue extensibility and allowing for proper ROM for normal function with self care, mobility, lifting and ambulation    GOALS     Patient stated goal: reduced pain  [] Progressing: [] Met: [] Not Met: [] Adjusted    Therapist goals for Patient:   Short Term Goals: To be achieved in: 3 weeks  Independent in HEP and progression per patient tolerance, in order to prevent re-injury.   [] Progressing: [] Met: [] Not Met: [] Adjusted  Patient will have a decrease in pain to <8/10 at worst to facilitate improvement in movement, function, and ADLs as indicated by Functional Deficits.  [] Progressing: [] Met: [] Not Met: [] Adjusted    Long Term Goals: To be achieved in: 6 weeks  Disability index score of NV% or less for the Quick DASH and Neck Disability Index to

## 2025-06-24 ENCOUNTER — HOSPITAL ENCOUNTER (OUTPATIENT)
Dept: PHYSICAL THERAPY | Age: 35
Setting detail: THERAPIES SERIES
Discharge: HOME | End: 2025-06-24
Payer: COMMERCIAL

## 2025-06-24 PROCEDURE — 97110 THERAPEUTIC EXERCISES: CPT

## 2025-06-24 PROCEDURE — 97112 NEUROMUSCULAR REEDUCATION: CPT

## 2025-06-24 PROCEDURE — 97140 MANUAL THERAPY 1/> REGIONS: CPT

## 2025-06-24 NOTE — FLOWSHEET NOTE
[facility] - Outpatient Rehabilitation and Therapy: [address], [place] [address] office: [phone] fax: [phone]       Physical Therapy: TREATMENT/PROGRESS NOTE   Patient: Dwayne Mckeon (35 y.o. female)   Examination Date: 2025   :  1990 MRN: [id number]   Visit #:   Insurance Allowable Auth Needed   30 soft max [x]Yes (after 30 visits)    []No    Insurance: Payor: [employer] / Plan: [employer] [place] MEDICAID / Product Type: *No Product type* /   Insurance ID: [id number] - (Medicaid Managed)  Secondary Insurance (if applicable):    Treatment Diagnosis:     ICD-10-CM    1. Radiating pain  R52       2. Decreased strength of upper extremity  R29.898          Medical Diagnosis:  Cervicalgia [M54.2]  Pain in right arm [M79.601]   Referring Physician: Madyson Hastings PA  PCP: Madyson Hastings PA     Plan of care signed (Y/N):     Date of Patient follow up with Physician:      Plan of Care Report: NO  POC update due: (10 visits /OR AUTH LIMITS, whichever is less)  2025                                             Medical History:  Comorbidities:  Other: PCOS, widespread pain in the foot and other body parts  Relevant Medical History: NA                                         Precautions/ Contra-indications:           Latex allergy:  NO  Pacemaker:    NO  Contraindications for Manipulation: None  Date of Surgery: NA  Other:    Red Flags:  None    Suicide Screening:   The patient did not verbalize a primary behavioral concern, suicidal ideation, suicidal intent, or demonstrate suicidal behaviors.    Preferred Language for Healthcare:   [x] English       [] other:    SUBJECTIVE EXAMINATION     Patient stated complaint:  has bad back pain today - thinks it is due to her cycle.  Some arm pain today      sore at shoulders.  Having trouble getting bands set up for rows for HEP. Goes back to work tomorrow.  Still on summer break from school. Reports no N/T since

## 2025-06-26 ENCOUNTER — HOSPITAL ENCOUNTER (OUTPATIENT)
Dept: PHYSICAL THERAPY | Age: 35
Setting detail: THERAPIES SERIES
Discharge: HOME | End: 2025-06-26
Payer: COMMERCIAL

## 2025-06-26 PROCEDURE — 97112 NEUROMUSCULAR REEDUCATION: CPT

## 2025-06-26 PROCEDURE — 97110 THERAPEUTIC EXERCISES: CPT

## 2025-06-26 PROCEDURE — 97140 MANUAL THERAPY 1/> REGIONS: CPT

## 2025-06-26 NOTE — FLOWSHEET NOTE
instructed to contact their primary care physician regarding ROS issues if not already being addressed at this time.    [x] Patient history, allergies, meds reviewed. Medical chart reviewed. See intake form.     OBJECTIVE EXAMINATION   6/26 6/17/25 tender to palpation: B UT, cerv PS    6/14/25  ROM/Strength: (Blank cells denote NT)      Mvmt (norm) AROM L AROM R PROM L PROM R Notes MMT L MMT R Notes     CERVICAL Flex (60) 60 deg         Ext (70) 30 deg        SB(45) 50 deg 50 deg          Rotation (80) 50 deg 50 deg             SHOULDER Flexion (180) WNL WNL   UT compensation with GH elevation 4 4-     Abduction (180) WNL WNL    4 4-     ER -0            ER -90 (90) WNL WNL  Painful in the posterior GH at end range  4 4     IR -0 WNL WNL    4 4- with pain     IR -90 (70)    Painful in the posterior GH at end range           Palpation:   Patient reported tenderness with palpation  Location: mild TTP in the R UT    Posture:   rounded shoulders and chronic UT tension    Specific Joint Mobility Testing/Accessory Motions:      Glenohumeral joint: WNL    Gait:    Pattern: WNL  Assistive Device Used: no AD    Balance:  WNL    Falls Risk Assessment (30 days):   Falls Risk assessed and no intervention required.  Time Up and Go (TUG):   Not Assessed        Exercises/Interventions     6/14/25  -Cervical radiculopathy, no re-production of symptoms on eval, pt to take notes of what position she is in when N/T is present  -appears to be tendonitis/small RTC tear of the infra or subscap    -poor mechanics in UE elevation      Therapeutic Ex (03182)  resistance Sets/time Reps Notes/Cues/Progressions          UBE L1  2' frd/2'retro  With CP as lumbar support    Wall walks        Pec stretch - doorway  2 positions  2x30\"  ea positions    SCM stretch Seated with lumbar roll  2x30\" B                  Scap squeeze   15x5\"    T-band rows  High  LPD  Middle   Gh ext Lime band     2x10  2x10  2x12  2x12 All with good upright posture

## 2025-07-01 ENCOUNTER — APPOINTMENT (OUTPATIENT)
Dept: PHYSICAL THERAPY | Age: 35
End: 2025-07-01
Payer: COMMERCIAL

## 2025-07-01 ENCOUNTER — HOSPITAL ENCOUNTER (OUTPATIENT)
Dept: PHYSICAL THERAPY | Age: 35
Setting detail: THERAPIES SERIES
Discharge: HOME | End: 2025-07-01
Payer: COMMERCIAL

## 2025-07-01 PROCEDURE — 97112 NEUROMUSCULAR REEDUCATION: CPT

## 2025-07-01 PROCEDURE — 97110 THERAPEUTIC EXERCISES: CPT

## 2025-07-01 NOTE — FLOWSHEET NOTE
[facility] - Outpatient Rehabilitation and Therapy: [address], [place] [address] office: [phone] fax: [phone]       Physical Therapy: TREATMENT/PROGRESS NOTE   Patient: Dwayne Mckeon (35 y.o. female)   Examination Date: 2025   :  1990 MRN: [id number]   Visit #:   Insurance Allowable Auth Needed   30 soft max [x]Yes (after 30 visits)    []No    Insurance: Payor: [employer] / Plan: [employer] [place] MEDICAID / Product Type: *No Product type* /   Insurance ID: [id number] - (Medicaid Managed)  Secondary Insurance (if applicable):    Treatment Diagnosis:     ICD-10-CM    1. Radiating pain  R52       2. Decreased strength of upper extremity  R29.898          Medical Diagnosis:  Cervicalgia [M54.2]  Pain in right arm [M79.601]   Referring Physician: Madyson Hastings PA  PCP: Madyson Hastings PA     Plan of care signed (Y/N):     Date of Patient follow up with Physician:      Plan of Care Report: NO  POC update due: (10 visits /OR AUTH LIMITS, whichever is less)  2025                                             Medical History:  Comorbidities:  Other: PCOS, widespread pain in the foot and other body parts  Relevant Medical History: NA                                         Precautions/ Contra-indications:           Latex allergy:  NO  Pacemaker:    NO  Contraindications for Manipulation: None  Date of Surgery: NA  Other:    Red Flags:  None    Suicide Screening:   The patient did not verbalize a primary behavioral concern, suicidal ideation, suicidal intent, or demonstrate suicidal behaviors.    Preferred Language for Healthcare:   [x] English       [] other:    SUBJECTIVE EXAMINATION     Patient stated complaint: : has not had any pain since last session.  Mild N/T remains, occurring 1-2x/week.  Feels like the therapy has been improving her symptoms.       feeling good today - no pain.  Low back better     has bad back pain today - thinks it is due

## 2025-07-03 ENCOUNTER — HOSPITAL ENCOUNTER (OUTPATIENT)
Dept: PHYSICAL THERAPY | Age: 35
Setting detail: THERAPIES SERIES
End: 2025-07-03
Payer: COMMERCIAL

## 2025-07-08 ENCOUNTER — APPOINTMENT (OUTPATIENT)
Dept: PHYSICAL THERAPY | Age: 35
End: 2025-07-08
Payer: COMMERCIAL

## 2025-07-10 ENCOUNTER — HOSPITAL ENCOUNTER (OUTPATIENT)
Dept: PHYSICAL THERAPY | Age: 35
Setting detail: THERAPIES SERIES
End: 2025-07-10
Payer: COMMERCIAL

## 2025-07-18 ENCOUNTER — APPOINTMENT (OUTPATIENT)
Dept: PHYSICAL THERAPY | Age: 35
End: 2025-07-18
Payer: COMMERCIAL

## 2025-07-21 ENCOUNTER — APPOINTMENT (OUTPATIENT)
Dept: PHYSICAL THERAPY | Age: 35
End: 2025-07-21
Payer: COMMERCIAL

## 2025-08-17 ENCOUNTER — OFFICE VISIT (OUTPATIENT)
Age: 35
End: 2025-08-17

## 2025-08-17 VITALS
TEMPERATURE: 98.2 F | SYSTOLIC BLOOD PRESSURE: 129 MMHG | HEIGHT: 62 IN | BODY MASS INDEX: 37.01 KG/M2 | OXYGEN SATURATION: 98 % | WEIGHT: 201.1 LBS | DIASTOLIC BLOOD PRESSURE: 87 MMHG | HEART RATE: 90 BPM

## 2025-08-17 DIAGNOSIS — R03.0 ELEVATED BLOOD PRESSURE READING WITHOUT DIAGNOSIS OF HYPERTENSION: ICD-10-CM

## 2025-08-17 DIAGNOSIS — R06.00 DYSPNEA, UNSPECIFIED TYPE: Primary | ICD-10-CM

## 2025-08-17 RX ORDER — MOXIFLOXACIN HYDROCHLORIDE 400 MG/1
400 TABLET ORAL DAILY
COMMUNITY
Start: 2025-08-14 | End: 2025-08-19

## 2025-08-17 RX ORDER — AZELASTINE 1 MG/ML
2 SPRAY, METERED NASAL 2 TIMES DAILY PRN
COMMUNITY
Start: 2025-07-21

## 2025-08-17 RX ORDER — OMEPRAZOLE 40 MG/1
40 CAPSULE, DELAYED RELEASE ORAL DAILY
COMMUNITY
Start: 2025-03-26

## 2025-08-17 RX ORDER — MONTELUKAST SODIUM 10 MG/1
10 TABLET ORAL NIGHTLY
COMMUNITY
Start: 2025-06-05

## 2025-08-17 RX ORDER — SPIRONOLACTONE 100 MG/1
100 TABLET, FILM COATED ORAL DAILY
COMMUNITY
Start: 2024-09-04

## 2025-08-17 RX ORDER — BENZONATATE 100 MG/1
100 CAPSULE ORAL 3 TIMES DAILY PRN
COMMUNITY
Start: 2025-08-14

## 2025-08-17 RX ORDER — ERGOCALCIFEROL 1.25 MG/1
50000 CAPSULE, LIQUID FILLED ORAL WEEKLY
COMMUNITY
Start: 2025-06-05

## 2025-08-17 RX ORDER — DULAGLUTIDE 3 MG/.5ML
3 INJECTION, SOLUTION SUBCUTANEOUS
COMMUNITY
Start: 2025-06-24

## 2025-08-17 RX ORDER — FLUTICASONE PROPIONATE 50 MCG
1 SPRAY, SUSPENSION (ML) NASAL DAILY PRN
COMMUNITY
Start: 2025-08-10

## 2025-08-17 RX ORDER — AMOXICILLIN 875 MG/1
875 TABLET, COATED ORAL 2 TIMES DAILY
COMMUNITY
Start: 2025-08-10

## 2025-08-17 RX ORDER — HYDROXYZINE HYDROCHLORIDE 25 MG/1
25 TABLET, FILM COATED ORAL 3 TIMES DAILY PRN
COMMUNITY
Start: 2025-06-05

## 2025-08-17 RX ORDER — CETIRIZINE HYDROCHLORIDE 10 MG/1
10 TABLET ORAL DAILY
COMMUNITY
Start: 2025-03-26

## 2025-08-17 RX ORDER — METHOCARBAMOL 750 MG/1
750 TABLET, FILM COATED ORAL 2 TIMES DAILY PRN
COMMUNITY
Start: 2025-06-05

## 2025-08-17 RX ORDER — SODIUM PHOSPHATE,MONO-DIBASIC 19G-7G/118
1000 ENEMA (ML) RECTAL DAILY
COMMUNITY